# Patient Record
Sex: MALE | Race: WHITE | NOT HISPANIC OR LATINO | Employment: FULL TIME | URBAN - METROPOLITAN AREA
[De-identification: names, ages, dates, MRNs, and addresses within clinical notes are randomized per-mention and may not be internally consistent; named-entity substitution may affect disease eponyms.]

---

## 2018-05-08 ENCOUNTER — OFFICE VISIT CONVERTED (OUTPATIENT)
Dept: PODIATRY | Facility: CLINIC | Age: 58
End: 2018-05-08
Attending: PODIATRIST

## 2018-09-13 ENCOUNTER — CONVERSION ENCOUNTER (OUTPATIENT)
Dept: SURGERY | Facility: CLINIC | Age: 58
End: 2018-09-13

## 2018-09-13 ENCOUNTER — OFFICE VISIT CONVERTED (OUTPATIENT)
Dept: SURGERY | Facility: CLINIC | Age: 58
End: 2018-09-13
Attending: SURGERY

## 2018-10-22 ENCOUNTER — OFFICE VISIT CONVERTED (OUTPATIENT)
Dept: SURGERY | Facility: CLINIC | Age: 58
End: 2018-10-22
Attending: SURGERY

## 2018-10-22 ENCOUNTER — CONVERSION ENCOUNTER (OUTPATIENT)
Dept: SURGERY | Facility: CLINIC | Age: 58
End: 2018-10-22

## 2020-12-01 ENCOUNTER — OFFICE VISIT CONVERTED (OUTPATIENT)
Dept: ORTHOPEDIC SURGERY | Facility: CLINIC | Age: 60
End: 2020-12-01
Attending: ORTHOPAEDIC SURGERY

## 2020-12-01 ENCOUNTER — CONVERSION ENCOUNTER (OUTPATIENT)
Dept: ORTHOPEDIC SURGERY | Facility: CLINIC | Age: 60
End: 2020-12-01

## 2020-12-10 ENCOUNTER — HOSPITAL ENCOUNTER (OUTPATIENT)
Dept: OTHER | Facility: HOSPITAL | Age: 60
Setting detail: RECURRING SERIES
Discharge: HOME | End: 2021-01-11
Attending: ORTHOPAEDIC SURGERY

## 2021-02-11 ENCOUNTER — OFFICE VISIT CONVERTED (OUTPATIENT)
Dept: ORTHOPEDIC SURGERY | Facility: CLINIC | Age: 61
End: 2021-02-11
Attending: ORTHOPAEDIC SURGERY

## 2021-04-08 ENCOUNTER — OFFICE VISIT CONVERTED (OUTPATIENT)
Dept: ORTHOPEDIC SURGERY | Facility: CLINIC | Age: 61
End: 2021-04-08
Attending: ORTHOPAEDIC SURGERY

## 2021-05-10 NOTE — H&P
History and Physical      Patient Name: Leonidas David   Patient ID: [id number]   Sex: Male   YOB: 1960    Primary Care Provider: Radha PRESTON   Referring Provider: Radha PRESTON    Visit Date: December 1, 2020    Provider: Deven Quick MD   Location: [facility]   Location Address: [address] [place] [address]  [id number]   Location Phone: [phone]          Chief Complaint  · Bilateral Knee Pain      History Of Present Illness  Leonidas David is a 60 year old male who presents today to [facility].      Patient presents today for an evaluation of bilateral knee pain. Patient does a lot of running and biking and his knee pain has been effecting it. He states he has had knee pain for 8-10 years that comes and goes. He states he noticed his knee pain progressively getting worse very slowly over the years but tolerable. He has episodes of his knee giving way but the last 3 weeks the pain has increased significantly to the point he almost cannot walk. He states when the knee is flared up it is an 8 out of 10 on the pain scale. When the knees act up it radiates down his leg and up his leg. He states he has to give up running and biking right now due to the onset of pain. He has noticed some swelling in his knees as well. The right knee is worse than the left.       Past Medical History  Bunion; Hypertension; Panda neuroma, left         Past Surgical History  Bunionectomy; Colonoscopy         Medication List  amlodipine 10 mg oral tablet; sertraline 50 mg oral tablet         Allergy List  NO KNOWN DRUG ALLERGIES       Allergies Reconciled  Family Medical History  Breast Neoplasm, Malignant; Heart Disease; Diabetes         Social History  Alcohol (Never); Tobacco (Never)         Review of Systems  · Constitutional  o Denies  o : fever, chills, weight loss  · Cardiovascular  o Denies  o : chest pain, shortness of breath  · Gastrointestinal  o Denies  o : liver  "disease, heartburn, nausea, blood in stools  · Genitourinary  o Denies  o : painful urination, blood in urine  · Integument  o Denies  o : rash, itching  · Neurologic  o Denies  o : headache, weakness, loss of consciousness  · Musculoskeletal  o Denies  o : painful, swollen joints  · Psychiatric  o Denies  o : drug/alcohol addiction, anxiety, depression      Vitals  Date Time BP Position Site L\R Cuff Size HR RR TEMP (F) WT  HT  BMI kg/m2 BSA m2 O2 Sat FR L/min FiO2        12/01/2020 09:16 AM      56 - R   165lbs 0oz 5'  9.5\" 24.02 1.92 97 %            Physical Examination  · Constitutional  o Appearance  o : well developed, well-nourished, no obvious deformities present  · Head and Face  o Head  o :   § Inspection  § : normocephalic  o Face  o :   § Inspection  § : no facial lesions  · Eyes  o Conjunctivae  o : conjunctivae normal  o Sclerae  o : sclerae white  · Ears, Nose, Mouth and Throat  o Ears  o :   § External Ears  § : appearance within normal limits  § Hearing  § : intact  o Nose  o :   § External Nose  § : appearance normal  · Neck  o Inspection/Palpation  o : normal appearance  o Range of Motion  o : full range of motion  · Respiratory  o Respiratory Effort  o : breathing unlabored  o Inspection of Chest  o : normal appearance  o Auscultation of Lungs  o : no audible wheezing or rales  · Cardiovascular  o Heart  o : regular rate  · Gastrointestinal  o Abdominal Examination  o : soft and non-tender  · Skin and Subcutaneous Tissue  o General Inspection  o : intact, no rashes  · Psychiatric  o General  o : Alert and oriented x3  o Judgement and Insight  o : judgment and insight intact  o Mood and Affect  o : mood normal, affect appropriate  · Right Knee  o Inspection  o : Sensation grossly intact. Neurovascular intact. Skin intact. Calf supple, non-tender. Dorsal Pedal Pulse 2+, posterior tibialis pulse 2+. Full weightbearing. Full flexion and extension. Mild tenderness of the lateral and medial joint " line. Mild swelling. No skin discoloration or atrophy. Stable to valgus/varus stress. Good strength in quadriceps, hamstrings, dorsiflexors, and plantar flexors. Negative Lachman. Negative Apley's. Negative McMurrays. Mild crepitus.   · Left Knee  o Inspection  o : Sensation grossly intact. Neurovascular intact. Skin intact. Calf supple, non-tender. Dorsal Pedal Pulse 2+, posterior tibialis pulse 2+. Full weightbearing. Full flexion and extension. No swelling, skin discoloration or atrophy. Stable to valgus/varus stress. Good strength in quadriceps, hamstrings, dorsiflexors, and plantar flexors. Negative Lachman. Negative Apley's. Negative McMurrays.   · In Office Procedures  o View  o : LAT/SUNRISE/STANDING   o Site  o : bilateral, knee  o Indication  o : Bilateral Knee Pain  o Study  o : X-rays ordered, taken in the office, and reviewed today.  o Xray  o : Mild degenerative changes of bilateral knees. No signs of fracture or dislocation.           Assessment  · Pain in both knees, unspecified chronicity       Pain in right knee     719.46/M25.561  Pain in left knee     719.46/M25.562  · Patellofemoral syndrome of both knees       Patellofemoral disorders, right knee     719.46/M22.2X1  Patellofemoral disorders, left knee     719.46/M22.2X2      Plan  · Orders  o Knee (Left) [facility] ([id number]) - 719.46/M25.562 - 12/01/2020  o Knee (Right) [facility] ([id number]) - 719.46/M25.561 - 12/01/2020  · Medications  o Medications have been Reconciled  o Transition of Care or Provider Policy  · Instructions  o Dr. Quick saw and examined the patient and agrees with plan.   o X-rays reviewed by Dr. Quick.  o Reviewed the patient's Past Medical, Social, and Family history as well as the ROS at today's visit, no changes.  o Call or return if worsening symptoms.  o Exercise handout given.  o Follow Up PRN.  o This note was transcribed by Pati Beckham.   o Discussed diagnosis and treatment options with the  patient. Patient has been taking Ibuprofen for relief of pain but has not have any other kind of treatments for his knees. Discussed different forms of conservative treatment, such as injections, NSAIDS, at home exercises and PT. Patient opted to try a different kind of NSAIDs and at home exercises. Patient given a note for PT in case at home exercises aren't effective enough. If pain doesn't improve or worsens he will consider an injection.            Electronically Signed by: Pati Beckham-, Other -Author on December 3, 2020 01:44:50 PM  Electronically Co-signed by: Deven Quick MD -Reviewer on December 4, 2020 07:39:05 AM

## 2021-05-14 VITALS — WEIGHT: 172 LBS | HEIGHT: 69 IN | HEART RATE: 76 BPM | BODY MASS INDEX: 25.48 KG/M2 | OXYGEN SATURATION: 98 %

## 2021-05-14 VITALS — HEART RATE: 56 BPM | OXYGEN SATURATION: 97 % | WEIGHT: 165 LBS | BODY MASS INDEX: 24.44 KG/M2 | HEIGHT: 69 IN

## 2021-05-14 VITALS — WEIGHT: 171.25 LBS | BODY MASS INDEX: 25.36 KG/M2 | HEART RATE: 76 BPM | OXYGEN SATURATION: 98 % | HEIGHT: 69 IN

## 2021-05-14 NOTE — PROGRESS NOTES
Progress Note      Patient Name: Leonidas David   Patient ID: [id number]   Sex: Male   YOB: 1960    Primary Care Provider: Radha PRESTON   Referring Provider: Radha PRESTON    Visit Date: April 8, 2021    Provider: Deven Quick MD   Location: [facility]   Location Address: [address] [place] [address]  [id number]   Location Phone: [phone]          Chief Complaint  · right knee pain      History Of Present Illness  Leonidas David is a 60 year old male who presents today to [facility].      Patient presents today for a follow-up of right knee pain. To review, he has had knee pain for 8-10 years that comes and goes. He states he noticed his knee pain progressively getting worse very slowly over the years but it was tolerable. He has episodes of his knee giving way but the last 4-5 weeks the pain has increased significantly to the point he almost cannot walk. He states when the knee is flared up it is an 8 out of 10 on the pain scale. When the knees act up it radiates down his leg and up his leg. He states he has to give up running and biking right now due to the onset of pain. He has noticed some swelling in his knees as well.   Since his last visit with us, he has pulled back from biking and running. He states he has returned from a trip from [email or web address]. He states the first day they walked 9 miles and he had swelling and pain in his right knee/lower leg. But afterwards, the rest of the trip, he didn't have any swelling or pain. Patient states that he hasn't been back to running or biking since his last visit with us. He has been attending physical therapy and it has been going well but his pain hasn't been improving. He states pain on the medial aspect of his knee and it is tender to touch. He states he recently got a promotion at his job and doesn't want to consider surgical intervention at this time.       Past Medical History  Bunion; Hypertension;  "Panda neuroma, left; Seasonal allergies         Past Surgical History  Bunionectomy; Colonoscopy         Medication List  amlodipine 10 mg oral tablet; meloxicam 15 mg oral tablet; sertraline 50 mg oral tablet         Allergy List  NO KNOWN DRUG ALLERGIES       Allergies Reconciled  Family Medical History  Breast Neoplasm, Malignant; Heart Disease; Cancer, Unspecified; Diabetes         Social History  Alcohol (Never); Alcohol Use (Current some day); lives with spouse; .; Recreational Drug Use (Never); Tobacco (Never); Working         Review of Systems  · Constitutional  o Denies  o : fever, chills, weight loss  · Cardiovascular  o Denies  o : chest pain, shortness of breath  · Gastrointestinal  o Denies  o : liver disease, heartburn, nausea, blood in stools  · Genitourinary  o Denies  o : painful urination, blood in urine  · Integument  o Denies  o : rash, itching  · Neurologic  o Denies  o : headache, weakness, loss of consciousness  · Musculoskeletal  o Denies  o : painful, swollen joints  · Psychiatric  o Denies  o : drug/alcohol addiction, anxiety, depression      Vitals  Date Time BP Position Site L\R Cuff Size HR RR TEMP (F) WT  HT  BMI kg/m2 BSA m2 O2 Sat FR L/min FiO2        04/08/2021 08:38 AM      76 - R   171lbs 4oz 5'  9\" 25.29 1.94 98 %            Physical Examination  · Constitutional  o Appearance  o : well developed, well-nourished, no obvious deformities present  · Head and Face  o Head  o :   § Inspection  § : normocephalic  o Face  o :   § Inspection  § : no facial lesions  · Eyes  o Conjunctivae  o : conjunctivae normal  o Sclerae  o : sclerae white  · Ears, Nose, Mouth and Throat  o Ears  o :   § External Ears  § : appearance within normal limits  § Hearing  § : intact  o Nose  o :   § External Nose  § : appearance normal  · Neck  o Inspection/Palpation  o : normal appearance  o Range of Motion  o : full range of motion  · Respiratory  o Respiratory Effort  o : breathing " unlabored  o Inspection of Chest  o : normal appearance  o Auscultation of Lungs  o : no audible wheezing or rales  · Cardiovascular  o Heart  o : regular rate  · Gastrointestinal  o Abdominal Examination  o : soft and non-tender  · Skin and Subcutaneous Tissue  o General Inspection  o : intact, no rashes  · Psychiatric  o General  o : Alert and oriented x3  o Judgement and Insight  o : judgment and insight intact  o Mood and Affect  o : mood normal, affect appropriate  · Right Knee  o Inspection  o : Sensation grossly intact. Neurovascular intact. Skin intact. Calf supple, non-tender. Dorsal Pedal Pulse 2+, posterior tibialis pulse 2+. Full weight bearing. Full flexion and extension. Non-tender lateral joint line. Tender medial joint line. Mild swelling. No skin discoloration or atrophy. Stable to valgus/varus stress. Good strength in quadriceps, hamstrings, dorsiflexors, and plantar flexors. Mild crepitus. Negative Lachman. Positive Apley's. Pain with Rosalio's.           Assessment  · Primary osteoarthritis of right knee     715.16/M17.11  · Right knee MMT (medial meniscus tear)     836.0/S83.249A  · Right knee pain, unspecified chronicity     719.46/M25.561      Plan  · Medications  o Medications have been Reconciled  o Transition of Care or Provider Policy  · Instructions  o Dr. Quick saw and examined the patient and agrees with plan.   o Reviewed the patient's Past Medical, Social, and Family history as well as the ROS at today's visit, no changes.  o Call or return if worsening symptoms.  o Follow Up PRN.  o Discussed treatment plans and diagnosis with the patient. Patient doesn't want to consider surgical intervention at this time due to his job promotion. He wishes to continue conservative management at this time. He will continue taking NSAIDs and ease back into running.   o The above service was scribed by Pati Beckham on my behalf and I attest to the accuracy of the note. mc            Electronically  Signed by: Pati Beckham-, Other -Author on April 8, 2021 09:28:34 AM  Electronically Co-signed by: Deven Quick MD -Reviewer on April 11, 2021 11:19:47 AM

## 2021-05-14 NOTE — PROGRESS NOTES
Progress Note      Patient Name: Leonidas David   Patient ID: [id number]   Sex: Male   YOB: 1960    Primary Care Provider: Radha PRESTON   Referring Provider: Radha PRESTON    Visit Date: February 11, 2021    Provider: Deven Quick MD   Location: [facility]   Location Address: [address] [place] [address]  [id number]   Location Phone: [phone]          Chief Complaint  · Right Knee Pain      History Of Present Illness  Leonidas David is a 60 year old male who presents today to [facility].      Patient presents today for a follow-up of right knee pain. Patient does a lot of running and biking and his knee pain has been effecting it. He states he has had knee pain for 8-10 years that comes and goes. He states he noticed his knee pain progressively getting worse very slowly over the years but it was tolerable. He has episodes of his knee giving way but the last 4-5 weeks the pain has increased significantly to the point he almost cannot walk. He states when the knee is flared up it is an 8 out of 10 on the pain scale. When the knees act up it radiates down his leg and up his leg. He states he has to give up running and biking right now due to the onset of pain. He has noticed some swelling in his knees as well. Patient presents today with MRI results of his right knee.          Past Medical History  Bunion; Hypertension; Panda neuroma, left; Seasonal allergies         Past Surgical History  Bunionectomy; Colonoscopy         Medication List  amlodipine 10 mg oral tablet; Mobic 15 mg oral tablet; sertraline 50 mg oral tablet         Allergy List  NO KNOWN DRUG ALLERGIES; NO KNOWN DRUG ALLERGIES       Allergies Reconciled  Family Medical History  Breast Neoplasm, Malignant; Heart Disease; Cancer, Unspecified; Diabetes         Social History  Alcohol (Never); Alcohol Use (Current some day); lives with spouse; .; Recreational Drug Use (Never); Tobacco  "(Never); Working         Review of Systems  · Constitutional  o Denies  o : fever, chills, weight loss  · Cardiovascular  o Denies  o : chest pain, shortness of breath  · Gastrointestinal  o Denies  o : liver disease, heartburn, nausea, blood in stools  · Genitourinary  o Denies  o : painful urination, blood in urine  · Integument  o Denies  o : rash, itching  · Neurologic  o Denies  o : headache, weakness, loss of consciousness  · Musculoskeletal  o Denies  o : painful, swollen joints  · Psychiatric  o Denies  o : drug/alcohol addiction, anxiety, depression      Vitals  Date Time BP Position Site L\R Cuff Size HR RR TEMP (F) WT  HT  BMI kg/m2 BSA m2 O2 Sat FR L/min FiO2 HC       02/11/2021 10:34 AM      76 - R   172lbs 0oz 5'  9.5\" 25.04 1.96 98 %            Physical Examination  · Constitutional  o Appearance  o : well developed, well-nourished, no obvious deformities present  · Head and Face  o Head  o :   § Inspection  § : normocephalic  o Face  o :   § Inspection  § : no facial lesions  · Eyes  o Conjunctivae  o : conjunctivae normal  o Sclerae  o : sclerae white  · Ears, Nose, Mouth and Throat  o Ears  o :   § External Ears  § : appearance within normal limits  § Hearing  § : intact  o Nose  o :   § External Nose  § : appearance normal  · Neck  o Inspection/Palpation  o : normal appearance  o Range of Motion  o : full range of motion  · Respiratory  o Respiratory Effort  o : breathing unlabored  o Inspection of Chest  o : normal appearance  o Auscultation of Lungs  o : no audible wheezing or rales  · Cardiovascular  o Heart  o : regular rate  · Gastrointestinal  o Abdominal Examination  o : soft and non-tender  · Skin and Subcutaneous Tissue  o General Inspection  o : intact, no rashes  · Psychiatric  o General  o : Alert and oriented x3  o Judgement and Insight  o : judgment and insight intact  o Mood and Affect  o : mood normal, affect appropriate  · Right Knee  o Inspection  o : Sensation grossly intact. " Neurovascular intact. Skin intact. Calf supple, non-tender. Dorsal Pedal Pulse 2+, posterior tibialis pulse 2+. Full weight bearing. Full flexion and extension. Mild tenderness of the lateral and medial joint line. Mild swelling. No skin discoloration or atrophy. Stable to valgus/varus stress. Good strength in quadriceps, hamstrings, dorsiflexors, and plantar flexors. Negative Lachman. Negative Apley's. Negative McMurrays. Mild crepitus.   · Imaging  o Imaging  o : 2/5/21 RIGHT KNEE MRI: 1. Tear involving the body and posterior horn medial meniscus, with a horizontal component extending to the free edge in the body, an oblique component extending to the inferior articular surface of the posterior horn and near the junction of the posterior horn and body. Additional peripheral tearing near the junction of the body and posterior horn. 2. Moderate degenerative changes and chondromalacia in the medial face patellar articular cartilage. 3. Focal nondisplaced subchondral fracture versus small focal of spontaneous osteonecrosis in the posterior weightbearing medial femoral condyle as described above. 4. Moderate sized joint effusion. 5. Medial, lateral and prepatellar edema.           Assessment  · Right subchondral stress fracture     821.20  · Right Knee MMT (medial meniscus tear)     836.0/S83.249A  · Right knee pain, unspecified chronicity     719.46/M25.561      Plan  · Medications  o Medications have been Reconciled  o Transition of Care or Provider Policy  · Instructions  o Dr. Quick saw and examined the patient and agrees with plan.   o X-rays reviewed by Dr. Quick.  o Reviewed the patient's Past Medical, Social, and Family history as well as the ROS at today's visit, no changes.  o Call or return if worsening symptoms.  o Follow up in 6 weeks.  o This note was transcribed by Pati Beckham.   o Discussed diagnosis and treatment plans with the patient. Patient opted for no running for 6 weeks.              Electronically Signed by: Pati Beckham-, Other -Author on February 12, 2021 08:35:03 AM  Electronically Co-signed by: Deven Quick MD -Reviewer on February 12, 2021 12:54:31 PM

## 2021-05-16 VITALS — OXYGEN SATURATION: 98 % | HEIGHT: 70 IN | WEIGHT: 173 LBS | HEART RATE: 58 BPM | BODY MASS INDEX: 24.77 KG/M2

## 2021-05-16 VITALS — RESPIRATION RATE: 14 BRPM | BODY MASS INDEX: 24.91 KG/M2 | WEIGHT: 174 LBS | HEIGHT: 70 IN

## 2021-05-16 VITALS — WEIGHT: 175 LBS | HEIGHT: 70 IN | RESPIRATION RATE: 14 BRPM | BODY MASS INDEX: 25.05 KG/M2

## 2021-08-03 ENCOUNTER — OFFICE VISIT (OUTPATIENT)
Dept: ORTHOPEDIC SURGERY | Facility: CLINIC | Age: 61
End: 2021-08-03

## 2021-08-03 VITALS — OXYGEN SATURATION: 96 % | WEIGHT: 171 LBS | HEART RATE: 49 BPM | HEIGHT: 69 IN | BODY MASS INDEX: 25.33 KG/M2

## 2021-08-03 DIAGNOSIS — M70.42 PREPATELLAR BURSITIS OF LEFT KNEE: Primary | ICD-10-CM

## 2021-08-03 DIAGNOSIS — S83.242A TEAR OF MEDIAL MENISCUS OF LEFT KNEE, CURRENT, UNSPECIFIED TEAR TYPE, INITIAL ENCOUNTER: ICD-10-CM

## 2021-08-03 PROCEDURE — 99214 OFFICE O/P EST MOD 30 MIN: CPT | Performed by: ORTHOPAEDIC SURGERY

## 2021-08-03 RX ORDER — SERTRALINE HYDROCHLORIDE 100 MG/1
TABLET, FILM COATED ORAL
COMMUNITY
Start: 2010-01-01

## 2021-08-03 RX ORDER — AMLODIPINE BESYLATE 10 MG/1
10 TABLET ORAL DAILY
COMMUNITY
Start: 2021-06-11

## 2021-08-03 RX ORDER — MELOXICAM 15 MG/1
15 TABLET ORAL DAILY
Qty: 30 TABLET | Refills: 0 | Status: SHIPPED | OUTPATIENT
Start: 2021-08-03 | End: 2022-08-10 | Stop reason: ALTCHOICE

## 2021-08-03 NOTE — PROGRESS NOTES
"Answers for HPI/ROS submitted by the patient on 7/30/2021  What is the primary reason for your visit?: Other  Please describe your symptoms.: Had MRI done per general practitioner's order and found left knee to have meniscus tear and some internal hemorrhaging.  I actually visited Dr. Quick for my right knee earlier in the year.  Have you had these symptoms before?: No  How long have you been having these symptoms?: Greater than 2 weeks  Please list any medications you are currently taking for this condition.: None  Please describe any probable cause for these symptoms. : Old age? :)    Chief Complaint  Initial Evaluation of the Left Knee     Subjective      Leonidas David presents to [facility] for an evaluation of left knee. Patient states that 4 weeks ago he states his knee felt like it was bruising and swelling. He lifted his pant leg and his knee and leg had turned black. The bruising has improved but his knee is still swelling. He went to his PCP and obtained an MRI of his left knee.     No Known Allergies     Social History     Socioeconomic History   • Marital status:      Spouse name: Not on file   • Number of children: Not on file   • Years of education: Not on file   • Highest education level: Not on file   Tobacco Use   • Smoking status: Never Smoker   Vaping Use   • Vaping Use: Never used   Substance and Sexual Activity   • Alcohol use: Never     Comment: rarely drinks 2 drinks per day   • Drug use: Never        Review of Systems     Objective   Vital Signs:   Pulse (!) 49   Ht 175.3 cm (69\")   Wt 77.6 kg (171 lb)   SpO2 96%   BMI 25.25 kg/m²       Physical Exam  Constitutional:       Appearance: Normal appearance. He is well-developed and normal weight.   HENT:      Head: Normocephalic.      Right Ear: Hearing and external ear normal.      Left Ear: Hearing and external ear normal.      Nose: Nose normal.   Eyes:      Conjunctiva/sclera: Conjunctivae normal. "   Cardiovascular:      Rate and Rhythm: Normal rate.   Pulmonary:      Effort: Pulmonary effort is normal.      Breath sounds: No wheezing or rales.   Abdominal:      Palpations: Abdomen is soft.      Tenderness: There is no abdominal tenderness.   Musculoskeletal:      Cervical back: Normal range of motion.   Skin:     Findings: No rash.   Neurological:      Mental Status: He is alert and oriented to person, place, and time.   Psychiatric:         Mood and Affect: Mood and affect normal.         Judgment: Judgment normal.       Ortho Exam      LEFT KNEE: Calf supple, non-tender. Full extension. Flexion is full. Positive Apley's. Tender medial joint line. Non-tender lateral joint line. Dorsal Pedal Pulse 2+, posterior tibialis pulse 2+. Swelling. No skin discoloration. No atrophy. Full weight bearing. Non-antalgic gait. Good strength to hamstrings, quadriceps, dorsiflexors and plantar flexors. Stable to varus/valgus stress. Negative Lachman.       Procedures      Imaging Results (Most Recent)     None           Result Review :       Left knee mri [facility] IMAGING 7/21/21 IMPRESSION: Radial tear posterior body segment extending into the posterior horn of the medial meniscus. Extensive anterior knee soft tissue swelling and edema with complex prepatellar fluid collection measures up to 3.5 cm may represent a hemorrhagic prepatellar bursitis. This appears new when compared to conventional radiographs 7/19/2021.          Assessment and Plan     DX: Left knee MMT   Left prepatellar bursitis     Discussed treatment plans and diagnosis with the patient. Patient to continue therapy. He was prescribed an anti-inflammatory in office today.     Call or return if worsening symptoms.    Follow Up     4-6 weeks.       Patient was given instructions and counseling regarding his condition or for health maintenance advice. Please see specific information pulled into the AVS if appropriate.     Scribed for Deven Quick MD by  Pati Beckham.  08/03/21   15:32 EDT

## 2021-08-27 ENCOUNTER — LAB REQUISITION (OUTPATIENT)
Dept: LAB | Facility: HOSPITAL | Age: 61
End: 2021-08-27

## 2021-08-27 DIAGNOSIS — N39.0 URINARY TRACT INFECTION, SITE NOT SPECIFIED: ICD-10-CM

## 2021-08-27 LAB
BACTERIA UR QL AUTO: ABNORMAL /HPF
BILIRUB UR QL STRIP: NEGATIVE
CLARITY UR: ABNORMAL
COLOR UR: ABNORMAL
GLUCOSE UR STRIP-MCNC: ABNORMAL MG/DL
HGB UR QL STRIP.AUTO: ABNORMAL
HYALINE CASTS UR QL AUTO: ABNORMAL /LPF
KETONES UR QL STRIP: ABNORMAL
LEUKOCYTE ESTERASE UR QL STRIP.AUTO: ABNORMAL
NITRITE UR QL STRIP: NEGATIVE
PH UR STRIP.AUTO: 5.5 [PH] (ref 5–8)
PROT UR QL STRIP: ABNORMAL
RBC # UR: ABNORMAL /HPF
REF LAB TEST METHOD: ABNORMAL
SP GR UR STRIP: >=1.03 (ref 1–1.03)
SQUAMOUS #/AREA URNS HPF: ABNORMAL /HPF
UROBILINOGEN UR QL STRIP: ABNORMAL
WBC UR QL AUTO: ABNORMAL /HPF

## 2021-08-27 PROCEDURE — 87086 URINE CULTURE/COLONY COUNT: CPT | Performed by: NURSE PRACTITIONER

## 2021-08-27 PROCEDURE — 81001 URINALYSIS AUTO W/SCOPE: CPT | Performed by: NURSE PRACTITIONER

## 2021-08-29 LAB — BACTERIA SPEC AEROBE CULT: NO GROWTH

## 2021-11-17 PROBLEM — N40.1 BENIGN PROSTATIC HYPERPLASIA WITH URINARY FREQUENCY: Status: ACTIVE | Noted: 2021-11-17

## 2021-11-17 PROBLEM — R35.0 BENIGN PROSTATIC HYPERPLASIA WITH URINARY FREQUENCY: Status: ACTIVE | Noted: 2021-11-17

## 2021-11-19 ENCOUNTER — OFFICE VISIT (OUTPATIENT)
Dept: UROLOGY | Facility: CLINIC | Age: 61
End: 2021-11-19

## 2021-11-19 VITALS — HEIGHT: 69 IN | BODY MASS INDEX: 25.33 KG/M2 | WEIGHT: 171 LBS | RESPIRATION RATE: 17 BRPM

## 2021-11-19 DIAGNOSIS — N40.1 BENIGN PROSTATIC HYPERPLASIA WITH URINARY FREQUENCY: Primary | ICD-10-CM

## 2021-11-19 DIAGNOSIS — R35.0 BENIGN PROSTATIC HYPERPLASIA WITH URINARY FREQUENCY: Primary | ICD-10-CM

## 2021-11-19 LAB
BILIRUB BLD-MCNC: NEGATIVE MG/DL
CLARITY, POC: CLEAR
COLOR UR: YELLOW
GLUCOSE UR STRIP-MCNC: NEGATIVE MG/DL
KETONES UR QL: NEGATIVE
LEUKOCYTE EST, POC: NEGATIVE
NITRITE UR-MCNC: NEGATIVE MG/ML
PH UR: 5.5 [PH] (ref 5–8)
PROT UR STRIP-MCNC: NEGATIVE MG/DL
RBC # UR STRIP: NEGATIVE /UL
SP GR UR: 1.03 (ref 1–1.03)
URINE VOLUME: 13
UROBILINOGEN UR QL: NORMAL

## 2021-11-19 PROCEDURE — 51798 US URINE CAPACITY MEASURE: CPT | Performed by: UROLOGY

## 2021-11-19 PROCEDURE — 81003 URINALYSIS AUTO W/O SCOPE: CPT | Performed by: UROLOGY

## 2021-11-19 PROCEDURE — 99203 OFFICE O/P NEW LOW 30 MIN: CPT | Performed by: UROLOGY

## 2021-11-19 RX ORDER — TAMSULOSIN HYDROCHLORIDE 0.4 MG/1
1 CAPSULE ORAL DAILY
COMMUNITY
End: 2022-02-22

## 2021-11-19 NOTE — PROGRESS NOTES
Chief Complaint: Urologic complaint    Subjective         History of Present Illness  Leonidas David is a 61 y.o. male presents to [facility] to be seen for BPH    Slow initiation of symptoms over the last several years.  Weak stream.  Some  trouble with initiation  of stream. Nocturia X  2-4.  No urgency or frequency.  Some post void dribbling that is bothersome.  No prostate meds    Mild bother.    Has a prescription for Flomax from his PCP has not started this yet.    PVR    11/21  013    8/21 UA-small blood, 2+ protein, 3+LE    no gross hematuria, dysuria or recurrent urinary tract infections.  No history of kidney stone.    No  malignancies, father did have BPH.   has never had any urologic surgery.    No cardiopulmonary history.  Patient does not smoke.  Patient does not use blood thinner.    10/21 scrotal ultrasound-3 mm simple cyst right testicle.  Left testicle normal.  6 mm simple cyst had right epididymis.  Negative otherwise    PSA    PCP is checking PSAs, I do not have records today he said it has been good.    1/13   0.75  12/11 0.61  12/10 1.0  1/08   0.9    Results for orders placed or performed in visit on 11/19/21   Bladder Scan   Result Value Ref Range    Urine Volume 13    POC Urinalysis Dipstick    Specimen: Urine   Result Value Ref Range    Color Yellow Yellow, Straw, Dark Yellow, Anastasiya    Clarity, UA Clear Clear    Glucose, UA Negative Negative, 1000 mg/dL (3+) mg/dL    Bilirubin Negative Negative    Ketones, UA Negative Negative    Specific Gravity  1.030 1.005 - 1.030    Blood, UA Negative Negative    pH, Urine 5.5 5.0 - 8.0    Protein, POC Negative Negative mg/dL    Urobilinogen, UA Normal Normal    Leukocytes Negative Negative    Nitrite, UA Negative Negative         Objective     Past Medical History:   Diagnosis Date   • Bunion    • Hypertension    • Panda neuroma, 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/08/2018   • Seasonal allergies        Past Surgical History:   Procedure Laterality  "Date   • BUNIONECTOMY     • COLONOSCOPY           Current Outpatient Medications:   •  amLODIPine (NORVASC) 10 MG tablet, Take 10 mg by mouth Daily., Disp: , Rfl:   •  sertraline (ZOLOFT) 100 MG tablet, sertraline 100 mg oral tablet take 1 tablet (100 mg) by oral route once daily   Suspended, Disp: , Rfl:   •  tamsulosin (FLOMAX) 0.4 MG capsule 24 hr capsule, Take 1 capsule by mouth Daily., Disp: , Rfl:   •  meloxicam (Mobic) 15 MG tablet, Take 1 tablet by mouth Daily., Disp: 30 tablet, Rfl: 0    No Known Allergies     Family History   Problem Relation Age of Onset   • Breast cancer Mother    • Heart disease Mother    • Cancer Mother         Unspecified   • Heart disease Father    • Diabetes Other        Social History     Socioeconomic History   • Marital status:    Tobacco Use   • Smoking status: Never Smoker   • Smokeless tobacco: Never Used   Vaping Use   • Vaping Use: Never used   Substance and Sexual Activity   • Alcohol use: Never     Comment: rarely drinks 2 drinks per day   • Drug use: Never       Vital Signs:   Resp 17   Ht 175.3 cm (69\")   Wt 77.6 kg (171 lb)   BMI 25.25 kg/m²      Physical exam    Alert and orient x3  Well appearing, well developed, in no acute distress   Unlabored respirations  Nontender/nondistended      Grossly oriented to person, place and time, judgment is intact, normal mood and affect    Results for orders placed or performed in visit on 08/27/21   Urine Culture - Urine, Urine, Clean Catch    Specimen: Urine, Clean Catch   Result Value Ref Range    Urine Culture No growth    Urinalysis With Microscopic If Indicated (No Culture) - Urine, Clean Catch    Specimen: Urine, Clean Catch   Result Value Ref Range    Color, UA Dark Yellow (A) Yellow, Straw    Appearance, UA Turbid (A) Clear    pH, UA 5.5 5.0 - 8.0    Specific Gravity, UA >=1.030 1.005 - 1.030    Glucose,  mg/dL (Trace) (A) Negative    Ketones, UA Trace (A) Negative    Bilirubin, UA Negative Negative    " Blood, UA Small (1+) (A) Negative    Protein,  mg/dL (2+) (A) Negative    Leuk Esterase, UA Large (3+) (A) Negative    Nitrite, UA Negative Negative    Urobilinogen, UA 0.2 E.U./dL 0.2 - 1.0 E.U./dL   Urinalysis, Microscopic Only - Urine, Clean Catch    Specimen: Urine, Clean Catch   Result Value Ref Range    RBC, UA 3-5 (A) None Seen, 0-2 /HPF    WBC, UA Too Numerous to Count (A) None Seen, 0-2 /HPF    Bacteria, UA None Seen None Seen /HPF    Squamous Epithelial Cells, UA 0-2 None Seen, 0-2 /HPF    Hyaline Casts, UA 3-6 None Seen /LPF    Methodology Automated Microscopy              Assessment and Plan    Diagnoses and all orders for this visit:    1. Benign prostatic hyperplasia with urinary frequency (Primary)      Records reviewed and summarized in the chart      Patient did have 2+ blood on dipstick done a few months ago.  Urine was negative today I will have him checked again when he comes back in.  This time no work-up warranted.  Discussed with the patient.      Patient mildly bothered after discussion risk benefits we will start Flomax 0.4 mg daily.    We will acquire his recent PSA records from his primary care physician.    Follow-up with nurse practitioner in 3 months for symptom check with UA with micro

## 2022-02-22 ENCOUNTER — OFFICE VISIT (OUTPATIENT)
Dept: UROLOGY | Facility: CLINIC | Age: 62
End: 2022-02-22

## 2022-02-22 VITALS — BODY MASS INDEX: 25.14 KG/M2 | HEIGHT: 70 IN | RESPIRATION RATE: 12 BRPM | WEIGHT: 175.6 LBS

## 2022-02-22 DIAGNOSIS — N40.1 BENIGN PROSTATIC HYPERPLASIA WITH URINARY FREQUENCY: Primary | ICD-10-CM

## 2022-02-22 DIAGNOSIS — R35.0 BENIGN PROSTATIC HYPERPLASIA WITH URINARY FREQUENCY: Primary | ICD-10-CM

## 2022-02-22 DIAGNOSIS — R31.9 HEMATURIA, UNSPECIFIED TYPE: ICD-10-CM

## 2022-02-22 DIAGNOSIS — R97.20 ELEVATED PROSTATE SPECIFIC ANTIGEN (PSA): ICD-10-CM

## 2022-02-22 PROBLEM — M21.619 BUNION: Status: ACTIVE | Noted: 2022-02-22

## 2022-02-22 PROBLEM — G57.60 MORTON'S NEUROMA: Status: ACTIVE | Noted: 2018-05-08

## 2022-02-22 PROBLEM — I10 HYPERTENSION: Status: ACTIVE | Noted: 2022-02-22

## 2022-02-22 PROBLEM — J30.2 SEASONAL ALLERGIC RHINITIS: Status: ACTIVE | Noted: 2022-02-22

## 2022-02-22 LAB
BILIRUB BLD-MCNC: NEGATIVE MG/DL
CLARITY, POC: CLEAR
COLOR UR: YELLOW
EXPIRATION DATE: ABNORMAL
GLUCOSE UR STRIP-MCNC: NEGATIVE MG/DL
KETONES UR QL: NEGATIVE
LEUKOCYTE EST, POC: NEGATIVE
Lab: ABNORMAL
NITRITE UR-MCNC: NEGATIVE MG/ML
PH UR: 5.5 [PH] (ref 5–8)
PROT UR STRIP-MCNC: NEGATIVE MG/DL
RBC # UR STRIP: ABNORMAL /UL
SP GR UR: 1.03 (ref 1–1.03)
URINE VOLUME: 0
UROBILINOGEN UR QL: NORMAL

## 2022-02-22 PROCEDURE — 51798 US URINE CAPACITY MEASURE: CPT | Performed by: NURSE PRACTITIONER

## 2022-02-22 PROCEDURE — 99213 OFFICE O/P EST LOW 20 MIN: CPT | Performed by: NURSE PRACTITIONER

## 2022-02-22 PROCEDURE — 81003 URINALYSIS AUTO W/O SCOPE: CPT | Performed by: NURSE PRACTITIONER

## 2022-02-22 RX ORDER — AMOXICILLIN AND CLAVULANATE POTASSIUM 875; 125 MG/1; MG/1
TABLET, FILM COATED ORAL
COMMUNITY
Start: 2022-02-21 | End: 2022-08-10 | Stop reason: ALTCHOICE

## 2022-02-22 RX ORDER — BETAMETHASONE DIPROPIONATE 0.5 MG/G
OINTMENT TOPICAL
COMMUNITY
Start: 2022-02-15

## 2022-02-22 NOTE — PROGRESS NOTES
Chief Complaint: Benign Prostatic Hypertrophy (pt here for follow up.  Pt has not been taking the flomax.) and Blood in Urine (pt has not seen any blood)    Subjective         History of Present Illness  Leonidas David is a 61 y.o. male presents to [facility] to be seen for f/u on BPH and hematuria.      He was started on medication at last visit however he is not happy with the side effects as he had some anorgasmia. No ED or retrograde ejaculation just was not fulfilled.    He is no longer taking tamsulosin.    He states that the postvoid dribble is the most bothersome of his symptoms.    He is with a weak stream.      Some  trouble with initiation  of stream.     Nocturia X  2-4.      No urgency or frequency.     Patient today brings in lab work from PCP office.    Patient's PSA last year was 0.7 and on recent check on 2/7/2022 is now 2.2.    The patient's percent free PSA has now went from 43 down to 9.     PVR        PSA trend:  1/20 0.7  1/13   0.75  12/11 0.61  12/10 1.0  1/08   0.9.    Objective     Past Medical History:   Diagnosis Date   • Bunion    • Hypertension    • Panda neuroma, left 05/08/2018   • Seasonal allergies        Past Surgical History:   Procedure Laterality Date   • BUNIONECTOMY     • COLONOSCOPY           Current Outpatient Medications:   •  amLODIPine (NORVASC) 10 MG tablet, Take 10 mg by mouth Daily., Disp: , Rfl:   •  amoxicillin-clavulanate (AUGMENTIN) 875-125 MG per tablet, , Disp: , Rfl:   •  betamethasone, augmented, (DIPROLENE) 0.05 % ointment, APPLY TO THE AFFECTED AREA(S) ONCE DAILY, Disp: , Rfl:   •  sertraline (ZOLOFT) 100 MG tablet, sertraline 100 mg oral tablet take 1 tablet (100 mg) by oral route once daily   Suspended, Disp: , Rfl:   •  meloxicam (Mobic) 15 MG tablet, Take 1 tablet by mouth Daily., Disp: 30 tablet, Rfl: 0    No Known Allergies     Family History   Problem Relation Age of Onset   • Breast cancer Mother    • Heart disease Mother  "   • Cancer Mother         Unspecified   • Heart disease Father    • Diabetes Other        Social History     Socioeconomic History   • Marital status:    Tobacco Use   • Smoking status: Never Smoker   • Smokeless tobacco: Never Used   Vaping Use   • Vaping Use: Never used   Substance and Sexual Activity   • Alcohol use: Never     Comment: rarely drinks 2 drinks per day   • Drug use: Never   • Sexual activity: Defer       Vital Signs:   Resp 12   Ht 176.5 cm (69.5\")   Wt 79.7 kg (175 lb 9.6 oz)   BMI 25.56 kg/m²      Physical Exam     Result Review :   The following data was reviewed by: MOHAN Stoll on 02/22/2022:  Results for orders placed or performed in visit on 02/22/22   Bladder Scan   Result Value Ref Range    Urine Volume 0    POC Urinalysis Dipstick, Automated    Specimen: Urine   Result Value Ref Range    Color Yellow Yellow, Straw, Dark Yellow, Anastasiya    Clarity, UA Clear Clear    Specific Gravity  1.030 1.005 - 1.030    pH, Urine 5.5 5.0 - 8.0    Leukocytes Negative Negative    Nitrite, UA Negative Negative    Protein, POC Negative Negative mg/dL    Glucose, UA Negative Negative, 1000 mg/dL (3+) mg/dL    Ketones, UA Negative Negative    Urobilinogen, UA Normal Normal    Bilirubin Negative Negative    Blood, UA Trace (A) Negative    Lot Number [id number]     Expiration Date 2,022/12             Procedures        Assessment and Plan    Diagnoses and all orders for this visit:    1. Benign prostatic hyperplasia with urinary frequency (Primary)  -     POC Urinalysis Dipstick, Automated  -     Bladder Scan  -     PSA, Total & Free; Future    2. Hematuria, unspecified type  -     POC Urinalysis Dipstick, Automated  -     Bladder Scan    3. Elevated prostate specific antigen (PSA)  -     MRI Prostate With & Without Contrast; Future  -     PSA, Total & Free; Future        Given the side effect profile of tamsulosin patient does not wish to proceed with any further medications at this point in " time.    Patient's microscopic hematuria is resolved at this point in time.    Discussed with patient given his drop in his percent free PSA and his increase in his total PSA we will proceed with MRI imaging to delineate any possible etiology.      I spent 20 minutes caring for Leonidas on this date of service. This time includes time spent by me in the following activities:reviewing tests, obtaining and/or reviewing a separately obtained history, performing a medically appropriate examination and/or evaluation , counseling and educating the patient/family/caregiver, ordering medications, tests, or procedures, and documenting information in the medical record  Follow Up   Return in about 4 weeks (around 3/22/2022) for f/u MRI .  Patient was given instructions and counseling regarding his condition or for health maintenance advice. Please see specific information pulled into the AVS if appropriate.         This document has been electronically signed by MOHAN Stoll  February 22, 2022 09:14 EST

## 2022-03-02 ENCOUNTER — APPOINTMENT (OUTPATIENT)
Dept: MRI IMAGING | Facility: HOSPITAL | Age: 62
End: 2022-03-02

## 2022-04-05 ENCOUNTER — HOSPITAL ENCOUNTER (OUTPATIENT)
Dept: MRI IMAGING | Facility: HOSPITAL | Age: 62
Discharge: HOME | End: 2022-04-05
Admitting: NURSE PRACTITIONER

## 2022-04-05 DIAGNOSIS — R97.20 ELEVATED PROSTATE SPECIFIC ANTIGEN (PSA): ICD-10-CM

## 2022-04-05 PROCEDURE — A9577 INJ MULTIHANCE: HCPCS | Performed by: NURSE PRACTITIONER

## 2022-04-05 PROCEDURE — 82565 ASSAY OF CREATININE: CPT

## 2022-04-05 PROCEDURE — 72197 MRI PELVIS W/O & W/DYE: CPT

## 2022-04-05 PROCEDURE — 0 GADOBENATE DIMEGLUMINE 529 MG/ML SOLUTION: Performed by: NURSE PRACTITIONER

## 2022-04-05 RX ADMIN — GADOBENATE DIMEGLUMINE 16 ML: 529 INJECTION, SOLUTION INTRAVENOUS at 20:36

## 2022-04-09 LAB — CREAT BLDA-MCNC: 1 MG/DL (ref 0.6–1.3)

## 2022-08-10 ENCOUNTER — OFFICE VISIT (OUTPATIENT)
Dept: PODIATRY | Facility: CLINIC | Age: 62
End: 2022-08-10

## 2022-08-10 VITALS
SYSTOLIC BLOOD PRESSURE: 137 MMHG | DIASTOLIC BLOOD PRESSURE: 86 MMHG | OXYGEN SATURATION: 97 % | HEART RATE: 53 BPM | WEIGHT: 169 LBS | HEIGHT: 69 IN | TEMPERATURE: 97.3 F | BODY MASS INDEX: 25.03 KG/M2

## 2022-08-10 DIAGNOSIS — M79.671 FOOT PAIN, RIGHT: Primary | ICD-10-CM

## 2022-08-10 DIAGNOSIS — M72.2 PLANTAR FASCIITIS: ICD-10-CM

## 2022-08-10 PROCEDURE — 99203 OFFICE O/P NEW LOW 30 MIN: CPT | Performed by: PODIATRIST

## 2022-08-10 RX ORDER — ASCORBIC ACID 500 MG
500 TABLET ORAL DAILY
COMMUNITY

## 2022-08-10 RX ORDER — MULTIPLE VITAMINS W/ MINERALS TAB 9MG-400MCG
1 TAB ORAL DAILY
COMMUNITY

## 2022-08-10 RX ORDER — METHYLPREDNISOLONE 4 MG/1
TABLET ORAL
Qty: 21 TABLET | Refills: 0 | Status: SHIPPED | OUTPATIENT
Start: 2022-08-10

## 2022-08-10 RX ORDER — NAPROXEN 500 MG/1
500 TABLET ORAL 2 TIMES DAILY WITH MEALS
Qty: 60 TABLET | Refills: 1 | Status: SHIPPED | OUTPATIENT
Start: 2022-08-10 | End: 2022-09-09

## 2022-08-10 NOTE — PROGRESS NOTES
[facility] - PODIATRY    Today's Date: 08/10/22    Patient Name: Leonidas David  MRN: [id number]  CSN: [id number]  PCP: Kaylan Cai APRN  Referring Provider: Referring, Self    SUBJECTIVE     Chief Complaint   Patient presents with   • Right Foot - Establish Care, Pain     Heel pain since June 2022     HPI: Leonidas David, a 62 y.o.male, comes to clinic.    New, Established, New Problem:  new    Location:  Right heel    Duration:  June     Onset:  gradual    Nature:  achy, sharp, shooting    Stable, worsening, improving:  Stable, no improvement    Aggravating factors:  Patient describes morning right heel pain as stabbing, burning, or aching. This pain usually subsides throughout the day, however it returns after periods of rest and sitting, when standing back up on their feet, and again the next morning.      Previous Treatment:  OTC ibuprofen    Patient denies any fevers, chills, nausea, vomiting, shortness of breath, nor any other constitutional signs nor symptoms.    No other pedal complaints at this time.    Past Medical History:   Diagnosis Date   • Bunion    • Foot pain, right    • Headache    • Hypertension    • Panda neuroma, left 05/08/2018   • Seasonal allergies    • Skin cancer      Past Surgical History:   Procedure Laterality Date   • BUNIONECTOMY     • COLONOSCOPY       Family History   Problem Relation Age of Onset   • Hypertension Mother    • Breast cancer Mother    • Heart disease Mother         Massive Coronary   • Cancer Mother         Breast   • Heart disease Father         Heart Attack / Angina   • Hypertension Father    • Diabetes Other      Social History     Socioeconomic History   • Marital status:    Tobacco Use   • Smoking status: Never Smoker   • Smokeless tobacco: Never Used   • Tobacco comment: Tried when I was a kid.  Didn't like it.   Vaping Use   • Vaping Use: Never used   Substance and Sexual Activity   • Alcohol use: Not Currently      Comment: 1 or 2 beers annually   • Drug use: Never   • Sexual activity: Yes     Partners: Female     Comment: wife     No Known Allergies  Current Outpatient Medications   Medication Sig Dispense Refill   • amLODIPine (NORVASC) 10 MG tablet Take 10 mg by mouth Daily.     • ascorbic acid (VITAMIN C) 500 MG tablet Take 500 mg by mouth Daily.     • betamethasone, augmented, (DIPROLENE) 0.05 % ointment APPLY TO THE AFFECTED AREA(S) ONCE DAILY     • multivitamin with minerals (MULTIVITAMIN ADULT PO) Take 1 tablet by mouth Daily.     • sertraline (ZOLOFT) 100 MG tablet sertraline 100 mg oral tablet take 1 tablet (100 mg) by oral route once daily   Suspended     • VITAMIN D PO Take  by mouth Daily.     • methylPREDNISolone (MEDROL) 4 MG dose pack Take as directed 21 tablet 0   • naproxen (Naprosyn) 500 MG tablet Take 1 tablet by mouth 2 (Two) Times a Day With Meals for 30 days. 60 tablet 1     No current facility-administered medications for this visit.     Review of Systems   Constitutional: Negative.    Musculoskeletal:        Right heel pain   All other systems reviewed and are negative.      OBJECTIVE     Vitals:    08/10/22 1329   BP: 137/86   Pulse: 53   Temp: 97.3 °F (36.3 °C)   SpO2: 97%       PHYSICAL EXAM     Foot/Ankle Exam:       General:   Appearance: appears stated age and healthy    Orientation: AAOx3    Affect: appropriate    Gait: unimpaired      VASCULAR      Right Foot Vascularity   Normal vascular exam    Dorsalis pedis:  2+  Posterior tibial:  2+  Skin Temperature: warm    Edema Grading:  None  CFT:  < 3 seconds  Pedal Hair Growth:  Present  Varicosities: none       Left Foot Vascularity   Normal vascular exam    Dorsalis pedis:  2+  Posterior tibial:  2+  Skin Temperature: warm    Edema Grading:  None  CFT:  < 3 seconds  Pedal Hair Growth:  Present  Varicosities: none        NEUROLOGIC     Right Foot Neurologic   Normal sensation    Light touch sensation:  Normal  Vibratory sensation:   Normal  Hot/Cold sensation: normal    Protective Sensation using [employer] Monofilament:  10     Left Foot Neurologic   Normal sensation    Light touch sensation:  Normal  Vibratory sensation:  Normal  Hot/cold sensation: normal    Protective Sensation using [employer] Monofilament:  10     MUSCULOSKELETAL      Right Foot Musculoskeletal   Tenderness: plantar fascia and plantar heel       MUSCLE STRENGTH     Right Foot Muscle Strength   Normal strength    Foot dorsiflexion:  5  Foot plantar flexion:  5  Foot inversion:  5  Foot eversion:  5     Left Foot Muscle Strength   Foot dorsiflexion:  5  Foot plantar flexion:  5  Foot inversion:  5  Foot eversion:  5     RANGE OF MOTION      Right Foot Range of Motion   Foot and ankle ROM within normal limits       Left Foot Range of Motion   Foot and ankle ROM within normal limits       DERMATOLOGIC     Right Foot Dermatologic   Skin: skin intact    Nails: normal       Left Foot Dermatologic   Skin: skin intact    Nails: normal        RADIOLOGY:    XR Foot 3+ View Right    Result Date: 8/10/2022  Narrative: IN-OFFICE IMAGING:  Standing, weightbearing, 3 view, AP, MO, Lateral, right foot Indication: Foot pain Findings: Well-healed fracture or bunionectomy of distal first metatarsal shaft.  Significant joint space narrowing in first MPJ.  Anterior cyma line break seen.  No periosteal reactions nor osteolytic changes seen.  No occult fractures seen. Comparison: No comparison views available.        ASSESSMENT/PLAN     Diagnoses and all orders for this visit:    1. Foot pain, right (Primary)    2. Plantar fasciitis  -     methylPREDNISolone (MEDROL) 4 MG dose pack; Take as directed  Dispense: 21 tablet; Refill: 0  -     naproxen (Naprosyn) 500 MG tablet; Take 1 tablet by mouth 2 (Two) Times a Day With Meals for 30 days.  Dispense: 60 tablet; Refill: 1      Comprehensive lower extremity examination and evaluation was performed.    Discussed findings and treatment  plan including risks, benefits, and treatment options with patient in detail. Patient agreed with treatment plan.    Treatment Options discussed:  - no treatment at all  - change in shoegear  - change in activities  - RICE therapy  - arch support  - NSAIDs  - PO steroids  - injectable steroids    Patient may begin to weight bear as tolerated in supportive shoes.  No impact activities for two weeks.  After that time, the patient may increase activities as tolerated. Patient states understanding and agreement with this plan.    An After Visit Summary was printed and given to the patient at discharge, including (if requested) any available informative/educational handouts regarding diagnosis, treatment, or medications. All questions were answered to patient/family satisfaction. Should symptoms fail to improve or worsen they agree to call or return to clinic or to go to the Emergency Department. Discussed the importance of following up with any needed screening tests/labs/specialist appointments and any requested follow-up recommended by me today. Importance of maintaining follow-up discussed and patient accepts that missed appointments can delay diagnosis and potentially lead to worsening of conditions.    Return in about 2 weeks (around 8/24/2022) for Injection f/u., or sooner if acute issues arise.    This document has been electronically signed by Ander Holly DPM on August 10, 2022 14:21 EDT